# Patient Record
Sex: FEMALE | Race: WHITE | URBAN - METROPOLITAN AREA
[De-identification: names, ages, dates, MRNs, and addresses within clinical notes are randomized per-mention and may not be internally consistent; named-entity substitution may affect disease eponyms.]

---

## 2021-03-04 ENCOUNTER — APPOINTMENT (OUTPATIENT)
Age: 61
Setting detail: DERMATOLOGY
End: 2021-03-06

## 2021-03-04 DIAGNOSIS — D485 NEOPLASM OF UNCERTAIN BEHAVIOR OF SKIN: ICD-10-CM

## 2021-03-04 DIAGNOSIS — D69.2 OTHER NONTHROMBOCYTOPENIC PURPURA: ICD-10-CM

## 2021-03-04 DIAGNOSIS — L57.8 OTHER SKIN CHANGES DUE TO CHRONIC EXPOSURE TO NONIONIZING RADIATION: ICD-10-CM

## 2021-03-04 PROBLEM — C44.722 SQUAMOUS CELL CARCINOMA OF SKIN OF RIGHT LOWER LIMB, INCLUDING HIP: Status: ACTIVE | Noted: 2021-03-04

## 2021-03-04 PROBLEM — D48.5 NEOPLASM OF UNCERTAIN BEHAVIOR OF SKIN: Status: ACTIVE | Noted: 2021-03-04

## 2021-03-04 PROBLEM — C44.622 SQUAMOUS CELL CARCINOMA OF SKIN OF RIGHT UPPER LIMB, INCLUDING SHOULDER: Status: ACTIVE | Noted: 2021-03-04

## 2021-03-04 PROCEDURE — OTHER PRESCRIPTION: OTHER

## 2021-03-04 PROCEDURE — 99203 OFFICE O/P NEW LOW 30 MIN: CPT

## 2021-03-04 PROCEDURE — OTHER MIPS QUALITY: OTHER

## 2021-03-04 PROCEDURE — OTHER COUNSELING: OTHER

## 2021-03-04 RX ORDER — FLURANDRENOLIDE 4 UG/CM2
TAPE TOPICAL
Qty: 1 | Refills: 2 | Status: ERX | COMMUNITY
Start: 2021-03-04

## 2021-03-04 ASSESSMENT — LOCATION SIMPLE DESCRIPTION DERM
LOCATION SIMPLE: RIGHT FOREARM
LOCATION SIMPLE: RIGHT POSTERIOR UPPER ARM
LOCATION SIMPLE: SCALP
LOCATION SIMPLE: LEFT CHEEK
LOCATION SIMPLE: RIGHT PRETIBIAL REGION
LOCATION SIMPLE: LEFT PRETIBIAL REGION
LOCATION SIMPLE: LEFT POSTERIOR UPPER ARM
LOCATION SIMPLE: LEFT FOREARM

## 2021-03-04 ASSESSMENT — LOCATION DETAILED DESCRIPTION DERM
LOCATION DETAILED: LEFT SUPERIOR LATERAL MALAR CHEEK
LOCATION DETAILED: RIGHT DISTAL PRETIBIAL REGION
LOCATION DETAILED: RIGHT DISTAL POSTERIOR UPPER ARM
LOCATION DETAILED: LEFT DISTAL POSTERIOR UPPER ARM
LOCATION DETAILED: RIGHT DISTAL DORSAL FOREARM
LOCATION DETAILED: LEFT SUPERIOR PARIETAL SCALP
LOCATION DETAILED: LEFT DISTAL DORSAL FOREARM
LOCATION DETAILED: LEFT DISTAL PRETIBIAL REGION

## 2021-03-04 ASSESSMENT — LOCATION ZONE DERM
LOCATION ZONE: SCALP
LOCATION ZONE: ARM
LOCATION ZONE: LEG
LOCATION ZONE: FACE

## 2021-05-04 ENCOUNTER — OFFICE VISIT (OUTPATIENT)
Dept: URBAN - METROPOLITAN AREA CLINIC 33 | Facility: CLINIC | Age: 61
End: 2021-05-04
Payer: MEDICAID

## 2021-05-04 DIAGNOSIS — H25.13 AGE-RELATED NUCLEAR CATARACT, BILATERAL: Primary | ICD-10-CM

## 2021-05-04 PROCEDURE — 99203 OFFICE O/P NEW LOW 30 MIN: CPT | Performed by: OPTOMETRIST

## 2021-05-04 ASSESSMENT — VISUAL ACUITY
OS: 20/25
OD: 20/20

## 2021-05-04 ASSESSMENT — INTRAOCULAR PRESSURE
OS: 18
OD: 18

## 2021-05-04 ASSESSMENT — KERATOMETRY
OD: 44.13
OS: 44.38

## 2021-05-04 NOTE — IMPRESSION/PLAN
Impression: Age-related nuclear cataract, bilateral: H25.13. Plan: Cataracts account for the patient's complaints. Patient notes seeing vision changes after hyperbaric oxygen therapy (stopped at this time). No treatment currently recommended. Recommend patient updates glasses prescription after vision is stable. The patient will monitor vision changes and contact us with any decrease in vision.

## 2021-05-11 ENCOUNTER — APPOINTMENT (OUTPATIENT)
Age: 61
Setting detail: DERMATOLOGY
End: 2021-05-17

## 2021-05-11 PROBLEM — C44.722 SQUAMOUS CELL CARCINOMA OF SKIN OF RIGHT LOWER LIMB, INCLUDING HIP: Status: ACTIVE | Noted: 2021-05-11

## 2021-05-11 PROCEDURE — OTHER INJECTION: OTHER

## 2021-05-11 PROCEDURE — OTHER INTRALESIONAL CHEMOTHERAPY INJECTION: OTHER

## 2021-05-11 PROCEDURE — OTHER OTHER: OTHER

## 2021-05-11 PROCEDURE — 96405 CHEMO INTRALESIONAL UP TO 7: CPT

## 2021-05-11 PROCEDURE — 99212 OFFICE O/P EST SF 10 MIN: CPT | Mod: 25

## 2021-05-11 PROCEDURE — OTHER COUNSELING: OTHER

## 2021-05-11 PROCEDURE — 11900 INJECT SKIN LESIONS </W 7: CPT | Mod: 59

## 2021-05-11 NOTE — PROCEDURE: OTHER
Other (Free Text): THE PATIENT PRESENTED TO ME FOR F/U AND TO DISCUSS VARIOUS TREATMENT OPTIONS FOR HER LEGS, PRIMARILY HER RIGHT LOWER LEG - WHICH HAS STARTED TO DEVELOP NUMEROUS TENDER KERATOTIC PAPULES THAT ARE CONSISTENT WITH SCC CLINICALLY.  SHE IS VERY CONCERNED PRIMARILY BECAUSE SHE IS ON [facility] FOR TREATMENT WITH DR DOMINIQUE TIAN.\\n\\nSHE DOES NOT WANT BIOPSIES TO BE DONE, AND REFUSES ANY OTHER TYPES OF INVASIVE PROCEDURES ON HER LEGS.  SHE IS \"TIRED OF ALL THE POKING\" AND STATES THAT THIS DID NOT HELP HER WITH THE HEALING OF THIS PERSISTENT SCC THAT IS STILL PLAGUING HER.\\n\\nTHE PATIENT REFUSES BIOPSY, SURGERY, RADIATION, AND YET WANTS TO FIND A WAY TO TREAT THESE LESIONS THAT ARE POPPING UP ALL OVER HER RIGHT LEG.\\n\\nSHE HAS A HISTORY OF SUN EXPOSURE IN THE PAST, AND IS AWARE OF THE RISKS THAT SHE HAD PERSONALLY THAT BRINGS A RISK OF DEVELOPING SCC.\\n\\nTHE PATIENT REFUSED ANY MANIPULATION OR TREATMENT OF THIS AREA AS SHE IS VERY NERVOUS ABOUT THINGS WORSENING IT'S CONDITION.\\n\\nHOWEVER SHE HAS 4 NEW PAPULES THAT ARE AT VARIOUS STAGES OF APPEARING ON HER -AND SHE \"DID HER RESEARCH\" AND SO I DISCUSSED DOING A TRIAL OF ILK ONLY TO THE TOP LESIONS (THE ANTERIOR SHIN, AND THE POST CALF.  THE TWO LOWER LEG LESIONS ON THE SHIN WE TREATED WITH A COMBINATION OF IL-5FU 0.2CC, AND THEN ILK-10 0.2CC TOO TO SEE WHICH ONE SHE LIKED BETTER. \\n\\nSHE WILL F/U WITH ME IN ONE MONTH AND I WILL MESSAGE DR TIAN TO KEEP HER IN THE LOOP\\n\\nPATEINT TO F/U W/ME 1 MONTH\\n Other (Free Text): THE PATIENT PRESENTED TO ME FOR F/U AND TO DISCUSS VARIOUS TREATMENT OPTIONS FOR HER LEGS, PRIMARILY HER RIGHT LOWER LEG - WHICH HAS STARTED TO DEVELOP NUMEROUS TENDER KERATOTIC PAPULES THAT ARE CONSISTENT WITH SCC CLINICALLY.  SHE IS VERY CONCERNED PRIMARILY BECAUSE SHE IS ON [facility] FOR TREATMENT WITH DR DOIMNIQUE TIAN.\\n\\nSHE DOES NOT WANT BIOPSIES TO BE DONE, AND REFUSES ANY OTHER TYPES OF INVASIVE PROCEDURES ON HER LEGS.  SHE IS \"TIRED OF ALL THE POKING\" AND STATES THAT THIS DID NOT HELP HER WITH THE HEALING OF THIS PERSISTENT SCC THAT IS STILL PLAGUING HER.\\n\\nTHE PATIENT REFUSES BIOPSY, SURGERY, RADIATION, AND YET WANTS TO FIND A WAY TO TREAT THESE LESIONS THAT ARE POPPING UP ALL OVER HER RIGHT LEG.\\n\\nSHE HAS A HISTORY OF SUN EXPOSURE IN THE PAST, AND IS AWARE OF THE RISKS THAT SHE HAD PERSONALLY THAT BRINGS A RISK OF DEVELOPING SCC.\\n\\nTHE PATIENT REFUSED ANY MANIPULATION OR TREATMENT OF THIS AREA AS SHE IS VERY NERVOUS ABOUT THINGS WORSENING IT'S CONDITION.\\n\\nHOWEVER SHE HAS 4 NEW PAPULES THAT ARE AT VARIOUS STAGES OF APPEARING ON HER -AND SHE \"DID HER RESEARCH\" AND SO I DISCUSSED DOING A TRIAL OF ILK ONLY TO THE TOP LESIONS (THE ANTERIOR SHIN, AND THE POST CALF.  THE TWO LOWER LEG LESIONS ON THE SHIN WE TREATED WITH A COMBINATION OF IL-5FU 0.2CC, AND THEN ILK-10 0.2CC TOO TO SEE WHICH ONE SHE LIKED BETTER. \\n\\nSHE WILL F/U WITH ME IN ONE MONTH AND I WILL MESSAGE DR TIAN TO KEEP HER IN THE LOOP\\n\\nPATEINT TO F/U W/ME 1 MONTH\\n

## 2021-06-08 ENCOUNTER — APPOINTMENT (OUTPATIENT)
Age: 61
Setting detail: DERMATOLOGY
End: 2021-06-11

## 2021-06-08 DIAGNOSIS — D485 NEOPLASM OF UNCERTAIN BEHAVIOR OF SKIN: ICD-10-CM

## 2021-06-08 PROBLEM — C44.722 SQUAMOUS CELL CARCINOMA OF SKIN OF RIGHT LOWER LIMB, INCLUDING HIP: Status: ACTIVE | Noted: 2021-06-08

## 2021-06-08 PROBLEM — D48.5 NEOPLASM OF UNCERTAIN BEHAVIOR OF SKIN: Status: ACTIVE | Noted: 2021-06-08

## 2021-06-08 PROCEDURE — OTHER DEFER: OTHER

## 2021-06-08 PROCEDURE — 99214 OFFICE O/P EST MOD 30 MIN: CPT | Mod: 25

## 2021-06-08 PROCEDURE — 11900 INJECT SKIN LESIONS </W 7: CPT | Mod: 59

## 2021-06-08 PROCEDURE — OTHER COUNSELING: OTHER

## 2021-06-08 PROCEDURE — OTHER OTHER: OTHER

## 2021-06-08 PROCEDURE — 96405 CHEMO INTRALESIONAL UP TO 7: CPT

## 2021-06-08 PROCEDURE — OTHER INJECTION: OTHER

## 2021-06-08 PROCEDURE — OTHER INTRALESIONAL CHEMOTHERAPY INJECTION: OTHER

## 2021-06-08 ASSESSMENT — LOCATION SIMPLE DESCRIPTION DERM: LOCATION SIMPLE: SCALP

## 2021-06-08 ASSESSMENT — PAIN INTENSITY VAS: HOW INTENSE IS YOUR PAIN 0 BEING NO PAIN, 10 BEING THE MOST SEVERE PAIN POSSIBLE?: 3/10 PAIN

## 2021-06-08 ASSESSMENT — LOCATION DETAILED DESCRIPTION DERM: LOCATION DETAILED: LEFT SUPERIOR PARIETAL SCALP

## 2021-06-08 ASSESSMENT — LOCATION ZONE DERM: LOCATION ZONE: SCALP

## 2021-06-08 NOTE — PROCEDURE: OTHER
Render Risk Assessment In Note?: no
Other (Free Text): THIS LESION HAS IMPROVED SINCE ILK10 LAST VISIT - BUT IS NOT RESOLVED.  \\nIT IS STILL PALPABLE, AND ALMOST HAS LICHENIFIED WARTY FEATURES TO IT.\\nWILL TRY ILK AGAIN TODAY AND SEE HOW IT RESOLVES AS IT IS VERY INFLAMED AND HAS A SURROUNDING MILD INFLAMMATORY DERMATITIS.
Note Text (......Xxx Chief Complaint.): This diagnosis correlates with the
Detail Level: Detailed
Other (Free Text): THIS LESION RESPONDED QUITE WELL TO THE COMBINATION OF THE IL5FU AND ILK10 AT THE LAST VISIT. THERE IS STILL A SMALL AMOUNT OF CLINICALLY OBVIOUS RESIDUAL NODULE THAT WE INJECTED TODAY WITH ANOTHER COMBINATION OF THE TWO MEDS.\\n\\nTHE PATIENT HAD NO ISSUES WITH THE INJECTIONS AND TOLERATED THEM WELL.\\nSHE WANTS TO CONTINUE TO TRY THESE INJECTIONS AND SEE HOW THEY RESPOND. \\nONE DISTAL LATERAL RIGHT LOWER LEG LESION HAS RESOLVED WITH JUST THAT FIRST INJECTION OF IL5FU/ILK10.\\n\\nWE HAD A LENGTHY DISCUSSION ABOUT HER PARTICULAR CASE - AND I EXPLAINED THAT I PRESENTED HER AT THE [facility] ONCOLOGY TUMOR BOARD -AND THAT DR TIAN (HER ONCOLOGIST) WAS PRESENT AS WELL.\\nTHE PLAN IS THAT THE PATIENT CAN CONTINUE F/U WITH ME FOR THE INTRALESIONAL INJECTIONS AND THEN DR TIAN AND THE PATIENT DISCUSSED THAT IT IS IMPORTANT THAT WE BIOPSY ANY NEW ADDITIONAL LESIONS THAT MAY POP UP ON HER HEAD/ARMS.\\n\\nTHE PATIENT HAD A PAPULE ON HER ARM AT THE LAST VISIT - AND TODAY IT IS INVOLUTING IN COMPARISON. SHE HAS EXTENSIVE ACTINIC PURPURA ON HER ARMS AND HAS BEEN SCRATCHING AND DOES NOT WANT A BIOPSY TODAY AS IT'S REDUCING IN SIZE.  SHE IS HESITANT TO \"DO TOO MUCH AT ONE TIME\"\\n\\Melanie FOR THE LARGE ORIGINAL SCC RECURRENCE THAT HAS BEEN NON-HEALING AND INFLAMED WITH EXTENSIVE SCAR TISSUE - THE PATIENT DOES **NOT** WANT ANY INJECTIONS, BIOPSIES OR TREATMENTS FOR IT AT THIS TIME.  TODAY THE PLAQUE APPEARS TO BE A WELL HEALED SCAR WITH MINIMAL INFLAMMATION (IMPROVED FROM LAST VISIT), HOWEVER THERE IS A FOCAL FRIABLE PAPULE WITHIN THE CENTER OF THE SCAR THAT MAY REPRESENT OVERGRANULATION TISSUE.  I OFFERED TO AT LEAST PERFORM SILVER NITRATE ON THIS - BUT SHE REFUSED STATING \"THIS IS GETTING MUCH BETTER\" AND DID NOT WANT ANY MANIPULATION OF THIS LESION.  PICS TAKEN TODAY.\\n\\nPATIENT WILL CONTINUE ON THE [facility] WITH DR TIAN - AND AS PER THE TUMOR BOARD DISCUSSION - IF DR TIAN FEELS THAT KAL IS PROGRESSING TOO MUCH, OR THAT SHE MAY NEED AN ALTERNATIVE REGIMEN - WE CAN HAVE KAL CONSULT WITH DR HERACLIO DAMON FOR EVALUATION OF A TRIAL MED, SUCH AS T-VEC.\\n\\nTHE PATIENT APPRECIATED THE INFORMATION, AND WILL F/U WITH ME IN ONE MONTH.

## 2021-06-08 NOTE — PROCEDURE: DEFER
Procedure To Be Performed At Next Visit: Biopsy by shave method
Detail Level: Detailed
Instructions (Optional): PATIENT REPORTS THAT THIS LESION IS GETTING BETTER, AND THAT SHE DOES NOT WANT A BIOPSY AT THIS TIME\\n\\nCLINICAL AND DERMOSCOPIC PHOTOS TAKEN TODAY FOR OBSERVATION  AND F/U SO WE CAN SEE HOW IT CHANGES OVER TIME.
Introduction Text (Please End With A Colon): Patient elects to monitor and observe .

## 2021-07-06 ENCOUNTER — APPOINTMENT (OUTPATIENT)
Age: 61
Setting detail: DERMATOLOGY
End: 2021-07-12

## 2021-07-06 PROBLEM — C44.729 SQUAMOUS CELL CARCINOMA OF SKIN OF LEFT LOWER LIMB, INCLUDING HIP: Status: ACTIVE | Noted: 2021-07-06

## 2021-07-06 PROBLEM — C44.722 SQUAMOUS CELL CARCINOMA OF SKIN OF RIGHT LOWER LIMB, INCLUDING HIP: Status: ACTIVE | Noted: 2021-07-06

## 2021-07-06 PROCEDURE — OTHER OTHER: OTHER

## 2021-07-06 PROCEDURE — 11900 INJECT SKIN LESIONS </W 7: CPT | Mod: 59

## 2021-07-06 PROCEDURE — 96405 CHEMO INTRALESIONAL UP TO 7: CPT

## 2021-07-06 PROCEDURE — OTHER INTRALESIONAL CHEMOTHERAPY INJECTION: OTHER

## 2021-07-06 PROCEDURE — 11102 TANGNTL BX SKIN SINGLE LES: CPT

## 2021-07-06 PROCEDURE — OTHER INJECTION: OTHER

## 2021-07-06 PROCEDURE — OTHER COUNSELING: OTHER

## 2021-07-06 PROCEDURE — OTHER BIOPSY BY SHAVE METHOD: OTHER

## 2021-07-06 NOTE — PROCEDURE: BIOPSY BY SHAVE METHOD
Validate Anticipated Plan: No
Dressing: Band-Aid
Render Post-Care Instructions In Note?: yes
Information: Selecting Yes will display possible errors in your note based on the variables you have selected. This validation is only offered as a suggestion for you. PLEASE NOTE THAT THE VALIDATION TEXT WILL BE REMOVED WHEN YOU FINALIZE YOUR NOTE. IF YOU WANT TO FAX A PRELIMINARY NOTE YOU WILL NEED TO TOGGLE THIS TO 'NO' IF YOU DO NOT WANT IT IN YOUR FAXED NOTE.
Curettage Text: The wound bed was treated with curettage after the biopsy was performed.
Notification Instructions: Patient will be notified of biopsy results. However, patient instructed to call the office if not contacted within 2 weeks.
Anesthesia Type: 1% lidocaine with epinephrine
Size Of Lesion In Cm: 0.3
Wound Care: Vaseline
Electrodesiccation And Curettage Text: The wound bed was treated with electrodesiccation and curettage after the biopsy was performed.
Post-Care Instructions: I reviewed with the patient in detail post-care instructions. Patient is to keep the biopsy site dry overnight, and then apply bacitracin twice daily until healed. Patient may apply hydrogen peroxide soaks to remove any crusting.
Depth Of Biopsy: dermis
Hemostasis: Electrocautery
Consent: Written consent was obtained and risks were reviewed including but not limited to scarring, infection, bleeding, scabbing, incomplete removal, nerve damage and allergy to anesthesia.
Biopsy Method: Dermablade
Biopsy Type: H and E
Billing Type: Third-Party Bill
Detail Level: Detailed
Cryotherapy Text: The wound bed was treated with cryotherapy after the biopsy was performed.
Type Of Destruction Used: Electrodesiccation
Silver Nitrate Text: The wound bed was treated with silver nitrate after the biopsy was performed.
Additional Anesthesia Volume In Cc (Will Not Render If 0): 0
Anesthesia Volume In Cc (Will Not Render If 0): 1
Electrodesiccation Text: The wound bed was treated with electrodesiccation after the biopsy was performed.

## 2021-07-06 NOTE — PROCEDURE: OTHER
Detail Level: Detailed
Note Text (......Xxx Chief Complaint.): This diagnosis correlates with the
Render Risk Assessment In Note?: no
Other (Free Text): THIS LESION HAS NOT IMPROVED S/P THE LAST TWO ILK10 VISITS.  IT STILL HAS THE WARTY FEATURES - AND SO WE PERFORMED A DEBULKING OF IT - AND SENT THIS SPECIMEN IN FOR PATH TO CONFIRM SCC .\\n\\nGIVEN HER VERY POSITIVE REACTIONS TO THE PRIOR ILK5FU/ILK10 COMBOS - WE DID THAT FOR THIS LESION TODAY.\\nTHE CONCENTRATION OF THE 5FU WAS 50MG/CC, NOT 500MG/CC, AND IT WAS ONLY INJECTED ONCE -DESPITE THE DUPLICATE DOCUMENTATION IN THIS NOTE.\\n\\nIT WAS TREATMENT NUMBER 1 (NOT 3) FOR THE IL5FU INJECTIONS, AND IT WAS TREATMENT NUMBER 3 FOR THE IL-KENALOG INJECTIONS.\\n\\nSINCE A BIOPSY WAS PERFORMED I CANNOT EDIT THIS PORTION OF THE NOTE IN THE PROPER FORMAT DUE TO THE CONSTRAINTS OF OUR EMR.\\n\\n\\n\\nOF NOTE - THE OTHER TWO SITES ON THE RIGHT LOWER LEG HAVE RESPONDED WELL AND WE HAVE NOT DONE INTRALESIONAL INJECTIONS ON THEM AT ALL THIS VISIT.

## 2021-08-03 ENCOUNTER — APPOINTMENT (OUTPATIENT)
Age: 61
Setting detail: DERMATOLOGY
End: 2021-08-10

## 2021-08-03 DIAGNOSIS — R22.9 LOCALIZED SWELLING, MASS AND LUMP, UNSPECIFIED: ICD-10-CM

## 2021-08-03 PROBLEM — C44.729 SQUAMOUS CELL CARCINOMA OF SKIN OF LEFT LOWER LIMB, INCLUDING HIP: Status: ACTIVE | Noted: 2021-08-03

## 2021-08-03 PROBLEM — C44.722 SQUAMOUS CELL CARCINOMA OF SKIN OF RIGHT LOWER LIMB, INCLUDING HIP: Status: ACTIVE | Noted: 2021-08-03

## 2021-08-03 PROCEDURE — OTHER INTRALESIONAL CHEMOTHERAPY INJECTION: OTHER

## 2021-08-03 PROCEDURE — OTHER INJECTION: OTHER

## 2021-08-03 PROCEDURE — OTHER COUNSELING: OTHER

## 2021-08-03 PROCEDURE — 11900 INJECT SKIN LESIONS </W 7: CPT | Mod: 59

## 2021-08-03 PROCEDURE — OTHER OTHER: OTHER

## 2021-08-03 PROCEDURE — 99212 OFFICE O/P EST SF 10 MIN: CPT | Mod: 25

## 2021-08-03 PROCEDURE — 96405 CHEMO INTRALESIONAL UP TO 7: CPT

## 2021-08-03 ASSESSMENT — LOCATION SIMPLE DESCRIPTION DERM: LOCATION SIMPLE: RIGHT PRETIBIAL REGION

## 2021-08-03 ASSESSMENT — LOCATION ZONE DERM: LOCATION ZONE: LEG

## 2021-08-03 ASSESSMENT — LOCATION DETAILED DESCRIPTION DERM: LOCATION DETAILED: RIGHT LATERAL DISTAL PRETIBIAL REGION

## 2021-08-03 NOTE — PROCEDURE: OTHER
Note Text (......Xxx Chief Complaint.): This diagnosis correlates with the
Render Risk Assessment In Note?: no
Other (Free Text): \\nPATIENT HAS A LENGTHY HISTORY REGARDING THIS LESION:\\Jerson MARCH 2018 FINISHED THE COURSE OF MAVYRET FOR HEP C, AND BEGAN TO NOTICE CUTANEOUS LESIONS DEVELOPING.\\n\\n[facility]:\\nHAD A LARGE SCC THAT WAS TREATED WITH MOHS AND RECONSTRUCTED 9/28/2018, THEN IT DID NOT HEAL RIGHT AND THEY REBIOPSIED IT AND FOUND PERSISTENT SCC. THEY DID RADIATION AT [facility] FOR 17 SESSIONS JAN-APR 2019. IT NEVER \"LOOKED RIGHT\".\\n\\nAPRIL 2019 IS WHEN SHE FEELS THAT SHE BEGAN TO DEVELOP TOO MANY SCC'S AND NUMEROUS BIOPSIES WERE DONE (PATIENT STATES 40+ LESIONS) -- [facility] WANTED TO DO MORE RADIATION, AND PATIENT REFUSED FURTHER MOHS. AT THIS POINT - SHE WAS REFERRED TO DR DOMINIQUE TIAN FOR CONSIDERATION OF SYSTEMIC THERAPY INSTEAD.\\n\\Jerson JUNE 2019 SHE WAS REFERRED BY DR TIAN TO INFECTIOUS DISEASE, AND BASICALLY SHE STATES THAT THEY SAID \"IT'S NOT INFECTION, YOUR WHOLE LEG WILL BE A FUTURE SCC\"\\n\\nDR ASMITA DID A PET SCAN IN SEPT 2019.\\n\\nFIRST [facility] INFUSION ON OCT 2019, THE SPOTS STARTED TO MELT AWAY... BUT SINCE MARCH/APRIL 2021 SHE IS DEVELOPING NEW LESIONS ON HER SCALP AND LEGS.\\n\\nDR ASMITA REFERRED TO WOUND CARE 2021 JANUARY FOR NON-HEALING WOUND AT THE SITE OF THE LARGE ORIGINAL SCC FROM 2018.  \\n\\nSHE WAS RECEIVING HYPERBARIC OXYGEN - AND THE WOUND CARE DOCTOR DID A BIOPSY AGAIN IN APRIL 2021 AND FOUND \"ATYPICAL NESTS OF SQUAMOUS CELLS UNDER THE SURFACE, CONCERNING FOR RESIDUAL SCC\" AND OFFERED TO OPERATE. BUT DR TIAN RECOMMENDED NOT OPERATING WITH THE WOUND CARE DOCTOR.\\n\\nTHE PATIENT PRESENTED TO ME FOR CONSIDERATION OF INTRALESIONAL CHEMOTHERAPY COMBINED WITH IL-KENALOG FOR THE NEW CLINICALLY OBVIOUS SCC'S ON THE LOWER LEGS.  SHE REFUSED ANY BIOPSY OR INJECTIONS OF THIS LARGE SCARRED PLAQUE.\\n\\Nik DEVELOPING A RELATIONSHIP OF TRUST WITH ME - I ENCOURAGED HER TO SEE A PLASTIC SURGEON FOR A WLE OF THIS PLAQUE AS I AM GREATLY CONCERNED ABOUT RESIDUAL SCC BEING PRESENT.  SHE HAS A HISTORY OF RADIATION (I'M ASSUMING SRT) AND MAY REQUIRE A STSG AND WOUND VAC IF IT CANNOT BE CLOSED.\\n\\nREFERRING HER TO DR DEBBIE GENAO.
Detail Level: Detailed

## 2021-08-03 NOTE — PROCEDURE: INJECTION
Post-Care Instructions: I reviewed with the patient in detail post-care instructions. Patient understands to keep the injection sites clean and call the clinic if there is any redness, swelling or pain.
Administered By (Optional): Dr. Anahi Rachel
Render J-Code Information In Note?: yes
Procedure Information: Please note that the numeric value listed in the Medication (1) and associated J-code units and Medication (2) and associated J-code units variables are j-code amounts and do not represent either the concentration or the total amount of the medications injected.  I strongly recommend selecting no to the Render J-code information in note question. This will allow your note to be more clear. If you are billing j-codes with your injection codes you need to document the total amount of the medication injected. This amount should match the j-code units. For example, if you are injecting Triamcinolone 40mg as an intramuscular injection you would select 40 for the dose field and mg for the units. This would allow you to document  with 4 units (40mg = 10mg x 4). The total volume is not used to calculate j-codes only the amount of the medication administered.
Administered By (Optional): Dr. Anahi Rachel
Dose Administered (Numbers Only): 0
Treatment Number: 1
Consent: The risks of the medication was reviewed with the patient.
Hide Second Medication?: No
Medication (1) And Associated J-Code Units: Triamcinolone acetonide, 10mg
Total Volume Injected In Cc (Will Not Affected Billing): 0.3
Detail Level: Detailed
units
Route: IL
Route: IL
Treatment Number: 2
Units: cc
Route: IL
Administered By (Optional): Dr. Anahi Rachel

## 2021-10-18 ENCOUNTER — APPOINTMENT (OUTPATIENT)
Age: 61
Setting detail: DERMATOLOGY
End: 2021-10-26

## 2021-10-18 DIAGNOSIS — Z85.828 PERSONAL HISTORY OF OTHER MALIGNANT NEOPLASM OF SKIN: ICD-10-CM

## 2021-10-18 PROBLEM — C44.729 SQUAMOUS CELL CARCINOMA OF SKIN OF LEFT LOWER LIMB, INCLUDING HIP: Status: ACTIVE | Noted: 2021-10-18

## 2021-10-18 PROBLEM — C44.722 SQUAMOUS CELL CARCINOMA OF SKIN OF RIGHT LOWER LIMB, INCLUDING HIP: Status: ACTIVE | Noted: 2021-10-18

## 2021-10-18 PROCEDURE — OTHER COUNSELING: OTHER

## 2021-10-18 PROCEDURE — 99212 OFFICE O/P EST SF 10 MIN: CPT | Mod: 25

## 2021-10-18 PROCEDURE — OTHER INTRALESIONAL KENALOG: OTHER

## 2021-10-18 PROCEDURE — OTHER OTHER: OTHER

## 2021-10-18 PROCEDURE — 11900 INJECT SKIN LESIONS </W 7: CPT | Mod: 59

## 2021-10-18 PROCEDURE — OTHER INTRALESIONAL CHEMOTHERAPY INJECTION: OTHER

## 2021-10-18 PROCEDURE — 96405 CHEMO INTRALESIONAL UP TO 7: CPT

## 2021-10-18 ASSESSMENT — LOCATION DETAILED DESCRIPTION DERM: LOCATION DETAILED: RIGHT DISTAL CALF

## 2021-10-18 ASSESSMENT — LOCATION SIMPLE DESCRIPTION DERM: LOCATION SIMPLE: RIGHT CALF

## 2021-10-18 ASSESSMENT — LOCATION ZONE DERM: LOCATION ZONE: LEG

## 2021-10-18 NOTE — PROCEDURE: INTRALESIONAL KENALOG
GENERAL: no acute distress, non-toxic appearing  HEAD: normocephalic, atraumatic  HEENT: normal conjunctiva, oral mucosa moist, neck supple  CARDIAC: regular rate and rhythm, normal S1 and S2,  no appreciable murmurs  PULM: clear to ascultation bilaterally, no crackles, rales, rhonchi, or wheezing    GI: abdomen nondistended, soft, nontender, no guarding or rebound tenderness    : no CVA tenderness, no suprapubic tenderness  NEURO: alert and oriented x 3, normal speech, PERRLA, EOMI, no focal motor or sensory deficits  MSK: no visible deformities, no peripheral edema, calf tenderness/redness/swelling  SKIN: no visible rashes, dry, well-perfused  PSYCH: appropriate mood and affect    VS significant: pulse ox down to 90s Include Z78.9 (Other Specified Conditions Influencing Health Status) As An Associated Diagnosis?: Yes

## 2021-10-18 NOTE — PROCEDURE: OTHER
Note Text (......Xxx Chief Complaint.): This diagnosis correlates with the
Render Risk Assessment In Note?: no
Other (Free Text): REFER TO DR GENAO. FOR WLE OF THE LARGE SCAR LIKE PLAQUE ON THE CALF.\\n\\nTHE PATIENT HAS BEEN FEELING AS THOUGH THIS SITE HAS RECENTLY BEGUN TO WORSEN AND IS CAUSING HER PAIN AND DISCOMFORT AGAIN.\\n\\nSHE IS SOFTENING TOWARDS SURGERY - AND UNDERSTANDS THAT WHILE Mohs SURGERY IS GREAT TOOL, THE NARROW MARGIN EXCISION IN HER CASE MAY NOT BE WHAT IS NECESSARY \\n\\nWE DISCUSSED THAT SHE CAN F/U WITH DR GENAO AND GET A WLE OF THIS PREVIOUSLY OPERATED ON PLAQUE AND HELP GET RID OF WHAT IS LIKELY UNDERLYING THIS, HOWEVER IF HIS PREFERENCE IS FOR US TO Mohs THIS LESION - THEN WE WILL HAVE TO FIGURE OUT BEST COURSE OF ACTION, AND DO LARGER MORE AGGRESSIVE MARGINS DURING MOHS.\\n\\nI WILL REACH OUT TO DR GENAO WITH DETAILS AND DISCUSS THE CASE.\\n\\nPATIENT ENCOURAGED TO CALL HIM TO SCHEDULE A TIME TO OPERATE AND ABSOLUTELY IF SHE HAS ANY OTHER AREAS THAT SHE WANTS HIM TO WORK ON (ON THE LEGS) AT THE SAME TIME AND HE IS WILLING - THAT IS MORE THAN FINE.\\n\\nTHE PATIENT HAS BEEN RESPONDING WELL TO THE IL5FU -BUT SINCE THE LARGER PLAQUE HAS BEEN MANIPULATED SO MUCH WITH PRIOR Mohs, RADIATION AND WOUND CARE - SHE HAS NOT ALLOWED US TO TREAT AT ALL.  \\n\\n
Detail Level: Detailed

## 2021-10-18 NOTE — PROCEDURE: INTRALESIONAL CHEMOTHERAPY INJECTION
Detail Level: Detailed Depth Of Biopsy: dermis Was A Bandage Applied: Yes Size Of Lesion In Cm: 1.2 X Size Of Lesion In Cm: 0 Biopsy Type: H and E Biopsy Method: Dermablade Anesthesia Type: 1% lidocaine with epinephrine Anesthesia Volume In Cc (Will Not Render If 0): 0.5 Hemostasis: Drysol Wound Care: Petrolatum Dressing: bandage Destruction After The Procedure: No Administered By (Optional): Anahi Rachel MD Type Of Destruction Used: Curettage Curettage Text: The wound bed was treated with curettage after the biopsy was performed. Cryotherapy Text: The wound bed was treated with cryotherapy after the biopsy was performed. Electrodesiccation Text: The wound bed was treated with electrodesiccation after the biopsy was performed. Electrodesiccation And Curettage Text: The wound bed was treated with electrodesiccation and curettage after the biopsy was performed. Silver Nitrate Text: The wound bed was treated with silver nitrate after the biopsy was performed. Lab: [id number] Lab Facility: [id number] Consent: Written consent was obtained and risks were reviewed including but not limited to scarring, infection, bleeding, scabbing, incomplete removal, nerve damage and allergy to anesthesia. Post-Care Instructions: I reviewed with the patient in detail post-care instructions. Patient is to keep the biopsy site dry overnight, and then apply bacitracin twice daily until healed. Patient may apply hydrogen peroxide soaks to remove any crusting. Notification Instructions: Patient will be notified of biopsy results. However, patient instructed to call the office if not contacted within 2 weeks. Billing Type: Third-Party Bill Information: Selecting Yes will display possible errors in your note based on the variables you have selected. This validation is only offered as a suggestion for you. PLEASE NOTE THAT THE VALIDATION TEXT WILL BE REMOVED WHEN YOU FINALIZE YOUR NOTE. IF YOU WANT TO FAX A PRELIMINARY NOTE YOU WILL NEED TO TOGGLE THIS TO 'NO' IF YOU DO NOT WANT IT IN YOUR FAXED NOTE.

## 2021-11-15 ENCOUNTER — APPOINTMENT (OUTPATIENT)
Age: 61
Setting detail: DERMATOLOGY
End: 2021-11-16

## 2021-11-15 DIAGNOSIS — Z85.828 PERSONAL HISTORY OF OTHER MALIGNANT NEOPLASM OF SKIN: ICD-10-CM

## 2021-11-15 DIAGNOSIS — L28.1 PRURIGO NODULARIS: ICD-10-CM

## 2021-11-15 PROBLEM — L30.9 DERMATITIS, UNSPECIFIED: Status: ACTIVE | Noted: 2021-11-15

## 2021-11-15 PROBLEM — C44.729 SQUAMOUS CELL CARCINOMA OF SKIN OF LEFT LOWER LIMB, INCLUDING HIP: Status: ACTIVE | Noted: 2021-11-15

## 2021-11-15 PROBLEM — C44.722 SQUAMOUS CELL CARCINOMA OF SKIN OF RIGHT LOWER LIMB, INCLUDING HIP: Status: ACTIVE | Noted: 2021-11-15

## 2021-11-15 PROCEDURE — OTHER PRESCRIPTION: OTHER

## 2021-11-15 PROCEDURE — OTHER COUNSELING: OTHER

## 2021-11-15 PROCEDURE — OTHER OTHER: OTHER

## 2021-11-15 PROCEDURE — 99213 OFFICE O/P EST LOW 20 MIN: CPT

## 2021-11-15 RX ORDER — TRIAMCINOLONE ACETONIDE 1 MG/G
OINTMENT TOPICAL
Qty: 80 | Refills: 1 | Status: ERX | COMMUNITY
Start: 2021-11-15

## 2021-11-15 ASSESSMENT — LOCATION DETAILED DESCRIPTION DERM
LOCATION DETAILED: RIGHT ANTERIOR PROXIMAL THIGH
LOCATION DETAILED: RIGHT DISTAL CALF
LOCATION DETAILED: LEFT DISTAL PRETIBIAL REGION
LOCATION DETAILED: LEFT ANTERIOR DISTAL THIGH
LOCATION DETAILED: RIGHT PROXIMAL PRETIBIAL REGION
LOCATION DETAILED: LEFT ANTERIOR PROXIMAL THIGH
LOCATION DETAILED: MID-OCCIPITAL SCALP

## 2021-11-15 ASSESSMENT — LOCATION SIMPLE DESCRIPTION DERM
LOCATION SIMPLE: POSTERIOR SCALP
LOCATION SIMPLE: RIGHT PRETIBIAL REGION
LOCATION SIMPLE: LEFT THIGH
LOCATION SIMPLE: LEFT PRETIBIAL REGION
LOCATION SIMPLE: RIGHT CALF
LOCATION SIMPLE: RIGHT THIGH

## 2021-11-15 ASSESSMENT — LOCATION ZONE DERM
LOCATION ZONE: SCALP
LOCATION ZONE: LEG

## 2021-11-15 NOTE — PROCEDURE: OTHER
Other (Free Text): REFER TO DR GENAO. FOR WLE OF THE LARGE SCAR LIKE PLAQUE ON THE CALF.\\n\\nTHE PATIENT HAS BEEN FEELING AS THOUGH THIS SITE HAS RECENTLY BEGUN TO WORSEN AND IS CAUSING HER PAIN AND DISCOMFORT AGAIN.\\n\\nSHE CONSULTED WITH DR GENAO LAST WEEK - AND IS WAITING FOR HIS OFFICE TO SCHEDULE SURGERY WITH HER.\\nSHE WILL UNDERGO A WLE AND HAVE A SPLIT THICKNESS SKIN GRAFT TO REPAIR IT.\\n\\n\\n\\n
Render Risk Assessment In Note?: no
Other (Free Text): THE PATIENT STATES THAT SHE IS VERY VIGILANT ABOUT WATCHING THE LESIONS ALL OVER HER BODY.\\nSHE. HAS NEW SPOTS THAT HAVE CROPPED UP ON HER THIGHS.\\n\\nSHE STATES THAT THESE AREAS ARE INCREDIBLY ITCHY AND SHE SCRATCHES AT THEM ALL THE TIME. \\n\\nI WARNED HER THAT SHE MAY BE CREATING A PRURIGO NODULE THAT IS THEN MIMICKING OR MORPHING INTO A SQUAMOUS CELL CARCINOMA.\\n\\nTHE PATIENT DID NOT MAKE A F/U WITH ANY GEN DERM FOR A SKIN CHECK YET\\n\\nSHE HAS BEEN SEEING ME FOR THE IL5FU INJECTIONS, AND NOW WILL BE F/U WITH DR GENAO FOR THE WLE OF THE ONE LARGE PLAQUE\\n\\nWE HAD A LENGTHY DISCUSSION ABOUT HOW THESE INJECTIONS ARE NOT DOING ENOUGH FOR ALL HER SPOTS, AND SHE CONTINUES TO GET NEW LESIONS.\\n\\nI ADVISED HER TO USE TRIAMCINOLONE OINTMENT TO SPOT TREAT THE ITCHY PAPULES AND PREVENT HER FROM PICKING AND SCRATCHING AT THEM CREATING A WORSENING EFFECT.\\n\\nI ASKED IF I COULD PERFORM BIOPSIES ON THE LESIONS THAT HAVE BEEN NOT RESPONDING SUFFICIENTLY TO ILK/IL5FU - OR IF I COULD PERFORM ON THE NEWER THIGH LESIONS, OR THE SCALP LESION... BUT SHE WAS VERY HESITANT AND SAID SHE DID NOT WANT ANY PROCEDURES IF SHE DID NOT NEED.\\n\\nI ENCOURAGED HER TO F/U WITH DR DAMON FOR A CONSULTATION AND TO DISCUSS TREATMENT OPTIONS.\\n\\nSHE HAS BEEN SEEING DR TIAN, AND WE DISCUSSED HER CASE AT OUR [facility]COLOGY TUMOR BOARD WHERE DR DAMON SAID THAT HE HAD SOME ONGOING CLINICAL TRIALS AND WOULD POSSIBLY BE ABLE TO USE TVEC CONCURRENTLY WITH HER LIBTAYO\\n\\nI WILL TIGER TEXT DR DAMON HER CONTACT INFO - AND CC DR TIAN SO SHE IS AWARE.\\n\\nPATIENT WAS ADVISED TO GET ALL HER RECORDS TOGETHER FOR DR DAMON TO REVIEW.
Detail Level: Detailed
Note Text (......Xxx Chief Complaint.): This diagnosis correlates with the
Other (Free Text): Pt declines any biopsy’s or 5-FU injections, refer to Dr. Bravo Edwards for treatment.

## 2021-12-01 NOTE — PROCEDURE: INTRALESIONAL CHEMOTHERAPY INJECTION
Consent: The risks of the medication were reviewed with the patient. Medication Injected: 5-Fluorouracil

## 2022-07-27 ENCOUNTER — APPOINTMENT (OUTPATIENT)
Dept: URBAN - METROPOLITAN AREA CLINIC 288 | Age: 62
Setting detail: DERMATOLOGY
End: 2022-08-02

## 2022-07-27 PROBLEM — C44.722 SQUAMOUS CELL CARCINOMA OF SKIN OF RIGHT LOWER LIMB, INCLUDING HIP: Status: ACTIVE | Noted: 2022-07-27

## 2022-07-27 PROCEDURE — OTHER INTRALESIONAL CHEMOTHERAPY INJECTION: OTHER

## 2022-07-27 PROCEDURE — 96405 CHEMO INTRALESIONAL UP TO 7: CPT

## 2022-07-27 PROCEDURE — OTHER COUNSELING: OTHER

## 2022-09-01 ENCOUNTER — APPOINTMENT (RX ONLY)
Dept: URBAN - METROPOLITAN AREA CLINIC 170 | Facility: CLINIC | Age: 62
Setting detail: DERMATOLOGY
End: 2022-09-01

## 2022-09-01 PROBLEM — C44.722 SQUAMOUS CELL CARCINOMA OF SKIN OF RIGHT LOWER LIMB, INCLUDING HIP: Status: ACTIVE | Noted: 2022-09-01

## 2022-09-01 PROCEDURE — ? INTRALESIONAL CHEMOTHERAPY INJECTION

## 2022-09-01 PROCEDURE — 96405 CHEMO INTRALESIONAL UP TO 7: CPT

## 2022-09-01 NOTE — PROCEDURE: INTRALESIONAL CHEMOTHERAPY INJECTION
Detail Level: Detailed
Bill J-Code?: Yes
Size Of Lesion In Cm (Optional): 0.8
X Size Of Lesion In Cm (Optional): 0.9
Treatment Number (Optional): 1
Medication Injected: 5-Fluorouracil
Total Volume (Ccs): 0.3
Lot # (Optional): [id number]
Expiration Date (Optional): 1/23
Administered By (Optional): BERNABE Joiner
Render Post-Care Instructions In Note?: no
Post-Care Instructions: I reviewed with the patient in detail post-care instructions. Patient is to keep the site dry overnight, and then apply bacitracin twice daily until healed. Patient may apply hydrogen peroxide soaks to remove any crusting.
Consent: The risks of medication reaction, injection site pain and lesion necrosis were reviewed with the patient.
Bill As A Line Item Or As Units: Line Item

## 2022-09-29 ENCOUNTER — APPOINTMENT (RX ONLY)
Dept: URBAN - METROPOLITAN AREA CLINIC 170 | Facility: CLINIC | Age: 62
Setting detail: DERMATOLOGY
End: 2022-09-29

## 2022-09-29 PROBLEM — C44.722 SQUAMOUS CELL CARCINOMA OF SKIN OF RIGHT LOWER LIMB, INCLUDING HIP: Status: ACTIVE | Noted: 2022-09-29

## 2022-09-29 PROCEDURE — 96405 CHEMO INTRALESIONAL UP TO 7: CPT

## 2022-09-29 PROCEDURE — ? OTHER

## 2022-09-29 PROCEDURE — ? INTRALESIONAL CHEMOTHERAPY INJECTION

## 2022-09-29 NOTE — PROCEDURE: OTHER
Detail Level: Zone
Render Risk Assessment In Note?: no
Note Text (......Xxx Chief Complaint.): This diagnosis correlates with the
Other (Free Text): First injection by Dr. Torres, lesion is being treated today with injection #3.

## 2022-09-29 NOTE — PROCEDURE: INTRALESIONAL CHEMOTHERAPY INJECTION
Detail Level: Detailed
Bill J-Code?: Yes
Size Of Lesion In Cm (Optional): 0.8
X Size Of Lesion In Cm (Optional): 0.9
Treatment Number (Optional): 2
Medication Injected: 5-Fluorouracil
Total Volume (Ccs): 0.3
Lot # (Optional): [id number]
Expiration Date (Optional): 1/23
Administered By (Optional): BERNABE Joiner
Render Post-Care Instructions In Note?: no
Post-Care Instructions: I reviewed with the patient in detail post-care instructions. Patient is to keep the site dry overnight, and then apply bacitracin twice daily until healed. Patient may apply hydrogen peroxide soaks to remove any crusting.
Consent: The risks of medication reaction, injection site pain and lesion necrosis were reviewed with the patient.
Bill As A Line Item Or As Units: Line Item

## 2022-10-27 ENCOUNTER — APPOINTMENT (RX ONLY)
Dept: URBAN - METROPOLITAN AREA CLINIC 165 | Facility: CLINIC | Age: 62
Setting detail: DERMATOLOGY
End: 2022-10-27

## 2022-10-27 PROBLEM — C44.722 SQUAMOUS CELL CARCINOMA OF SKIN OF RIGHT LOWER LIMB, INCLUDING HIP: Status: ACTIVE | Noted: 2022-10-27

## 2022-10-27 PROCEDURE — 96405 CHEMO INTRALESIONAL UP TO 7: CPT

## 2022-10-27 PROCEDURE — ? INTRALESIONAL CHEMOTHERAPY INJECTION

## 2022-10-27 PROCEDURE — ? OTHER

## 2022-10-27 NOTE — PROCEDURE: OTHER
Detail Level: Zone
Render Risk Assessment In Note?: no
Note Text (......Xxx Chief Complaint.): This diagnosis correlates with the
Other (Free Text): First injection by Dr. Torres, lesion is being treated today with injection #4.

## 2022-10-27 NOTE — PROCEDURE: INTRALESIONAL CHEMOTHERAPY INJECTION
Detail Level: Detailed
Bill J-Code?: Yes
Size Of Lesion In Cm (Optional): 0.7
X Size Of Lesion In Cm (Optional): 0.8
Treatment Number (Optional): 3
Medication Injected: 5-Fluorouracil
Total Volume (Ccs): 0.3
Lot # (Optional): [id number]
Expiration Date (Optional): 1/23
Administered By (Optional): BERNABE Joiner
Render Post-Care Instructions In Note?: no
Post-Care Instructions: I reviewed with the patient in detail post-care instructions. Patient is to keep the site dry overnight, and then apply bacitracin twice daily until healed. Patient may apply hydrogen peroxide soaks to remove any crusting.
Consent: The risks of medication reaction, injection site pain and lesion necrosis were reviewed with the patient.
Bill As A Line Item Or As Units: Line Item
